# Patient Record
Sex: MALE | Race: WHITE
[De-identification: names, ages, dates, MRNs, and addresses within clinical notes are randomized per-mention and may not be internally consistent; named-entity substitution may affect disease eponyms.]

---

## 2019-10-09 ENCOUNTER — HOSPITAL ENCOUNTER (OUTPATIENT)
Dept: HOSPITAL 61 - PNCL | Age: 39
Discharge: HOME | End: 2019-10-09
Attending: ANESTHESIOLOGY
Payer: OTHER GOVERNMENT

## 2019-10-09 DIAGNOSIS — M19.90: ICD-10-CM

## 2019-10-09 DIAGNOSIS — Z79.899: ICD-10-CM

## 2019-10-09 DIAGNOSIS — M79.605: ICD-10-CM

## 2019-10-09 DIAGNOSIS — Z98.890: ICD-10-CM

## 2019-10-09 DIAGNOSIS — M54.5: Primary | ICD-10-CM

## 2019-10-09 PROCEDURE — G0463 HOSPITAL OUTPT CLINIC VISIT: HCPCS

## 2019-10-09 NOTE — PAIN
DATE OF SERVICE:  10/09/2019



INITIAL CONSULTATION FOR PAIN CLINIC



CHIEF COMPLAINT:  Low back and left lower extremity pain.



HISTORY OF PRESENT ILLNESS:  The patient is a 39-year-old male who presents with

history of pain in the low back, left leg for several years, worse over the past

1 year.  Increasing pain in posterior gluteus, radiating in the posterior thigh

and posterior calf on the left side.  The patient reports it is worse with

walking, standing, changing positions, better with sitting or lying down, but

does awaken him from sleep at least once a night.  The patient reports it does

not affect his bowel or bladder control, but does affect his ability to walk

with significant fatigability in the left leg, but no actual motor loss.  The

patient has had chiropractic treatment, physical therapies in the past, which

were not significantly helpful.  Trigger point injections which did help in

2017, also previous surgery in 2008 with injections in 2012 and radiofrequency

ablation in 2012, all of which helped to some extent, but nothing long lasting. 

The patient is taking Percocet, which helped in the past, but has not had it for

several years as well.  The patient reports the pain is constant, shooting,

radiating into the low back and the left lower extremity as described, burning

and aching at times; again worse with activity, walking, standing; better with

sitting or lying down.  The patient rates his disability rating from 0-10, 10

being the worst, is a 6 with family home responsibilities, social activity and

sexual behavior, 8 with recreation, 7 with occupational activities, 4 with

self-care and life support activities.  The patient did have an MRI scan that is

dated 10/16/2015, showing L5-S1 left paracentral lateral recess disk protrusion

contacting the transitioning nerve roots with moderate left neural foraminal

narrowing.  The patient has a more recent MRI scan, which is pending at time of

this dictation.



PAST MEDICAL HISTORY:  Significant for arthritis, previous microdiskectomy in

2008, wisdom teeth extraction, fractured jaw and pilonidal cyst removed.



CURRENT MEDICATIONS:  Include over-the-counter Tylenol.



ALLERGIES:  The patient has no known drug allergies.



FAMILY HISTORY:  Significant for no major medical problems or conditions that he

is aware of.



SOCIAL HISTORY:  The patient does not smoke.  Does chew tobacco.  Has about 3

alcoholic drinks a month on average.  Does not use any illegal, illicit or

recreational drugs.  He is , lives with his spouse, has 2 children living

at home, is on active  duty.  Lives in [place], [place].



REVIEW OF SYSTEMS:  The patient's review of systems is positive for those items

mentioned in history of present illness.  All systems reviewed and otherwise

negative.  It is complete, full and well documented on the patient's chart.



PHYSICAL EXAMINATION:

VITAL SIGNS:  The patient's blood pressure 129/87, pulse 71, respirations 16,

temperature 97.7 degrees Fahrenheit, height is 74 inches, weight is 281 pounds.

GENERAL:  The patient is awake, alert, oriented, appropriate, very pleasant

demeanor.

HEENT:  Head shows normocephalic, atraumatic.  Extraocular movements are intact

and symmetrical.  Oral cavity; mucous membranes moist and pink.  Dentition is

intact.

NECK:  Shows anterior throat supple without palpable lymphadenopathy noted. 

Swallow reflex symmetrical.

CHEST:  Shows normal on inspection.  Breath sounds clear to auscultation

bilaterally.

HEART:  Shows S1, S2 clear.  No murmurs auscultated.

ABDOMEN:  Soft, nontender, nondistended.  No palpable organomegaly is noted.  No

rebound or guarding demonstrated.

BACK:  Shows spine grossly in the midline.  Normal appearing thoracic kyphosis

and lumbar lordotic curvature.  Well-healed surgical scars noted in the lumbar

distribution.  Lumbar paraspinous muscle shows symmetrical on inspection, on

palpation shows some moderate tenderness diffusely bilaterally, but only

diffusely without significant radiation.  The patient has good rotational motion

of lumbar spine, both laterally as well as extension and flexion without

significant difficulty.

EXTREMITIES:  The patient's lower extremities show deep tendon reflexes at 2+ in

patellar and 1+ tendo-calcaneus tendons.  Motor exam is strong with 5/5

dorsiflexion, extension, quadriceps and hamstring flexion, symmetrical and equal

bilaterally.  The patient's peripheral pulses are 1+ posterior tibial.  No

peripheral edema is noted.  Lower extremities are warm and dry to touch, equal

in color and appearance.  Straight leg raise noted to be mildly positive on the

left with about 40-45 degrees leg raise, which is decreased with knee flexion,

right side is negative.  Gaenslen and Bridger tests are negative bilaterally. 

The patient is able to stand, stand on his toes, has difficulty with balance,

walking with a normal appearing gait, does not appear to favor the right or left

lower extremity significantly, not using any assistive devices to ambulate.

SKIN:  Shows warm and dry, good turgor.  No edema.  No sores, rashes or bruising

throughout.



IMPRESSION:

1.  This is a 39-year-old male with long history of low back pain, status post

lumbar diskectomy in 2008, now with persistent radicular pain in the left lower

extremity, in L5-S1 dermatomal distribution.

2.  MRI scan as noted.

3.  History of arthritis.



PLAN:  Options were discussed with the patient including conservative medical

managements, physical therapies and interventional techniques and he would like

to pursue interventional techniques as he has had success with these in the

past.  We discussed a left L5-S1 transforaminal injection using fluoroscopic

guidance using description as well as anatomical models to describe the

procedure.  Risks were discussed as well.  The patient would like to proceed

with this, he will wait for preauthorization from his insurance provider and the

patient will continue to do stretching and strengthening exercises in the

meantime and once approval is at maintaining, we will have him return for left

L5-S1 transforaminal injection at that time.

 



______________________________

AMBER URBINA MD



DR:  PRATEEK/chelsy  JOB#:  [id number] / [id number]

DD:  10/09/2019 10:15  DT:  10/09/2019 11:22

## 2019-10-18 ENCOUNTER — HOSPITAL ENCOUNTER (OUTPATIENT)
Dept: HOSPITAL 61 - PNCL | Age: 39
End: 2019-10-18
Attending: ANESTHESIOLOGY
Payer: OTHER GOVERNMENT

## 2019-10-18 DIAGNOSIS — M51.16: Primary | ICD-10-CM

## 2019-10-18 PROCEDURE — 64483 NJX AA&/STRD TFRM EPI L/S 1: CPT

## 2019-10-18 NOTE — PAIN
DATE OF SERVICE:  10/18/2019



PROGRESS NOTE FOR PAIN CLINIC



DIAGNOSES:  Lumbar radiculopathy with lumbar degenerative disk disease.



HISTORY OF PRESENT ILLNESS:  The patient is a 39-year-old male who returns for

followup status post initial evaluation and preauthorization for left

transforaminal injection at L5-S1.  The patient reports still significant pain

in the low back, left lower extremity, posterior gluteus, posterior thigh,

posterior calf, radiating with walking, standing, changing positions, better

with sitting or lying down, does not awaken him from sleep at night.  The

patient reports the pain is shooting and becoming more constant with activity,

rates a 9 on a scale of 10 at its worst over the past week, 7 on average, 5 at

its least and is a 5 today.  The patient reports no new motor or sensory

deficits, no new bowel or bladder incontinence or other complaints.



PHYSICAL EXAMINATION:

VITAL SIGNS:  The patient's blood pressure is 138/85, pulse 76, respirations 18,

temperature 98.3 degrees Fahrenheit, height is 74 inches, weight is 278 pounds.

GENERAL:  The patient is awake, alert, oriented, appropriate, very pleasant

demeanor.

HEENT:  Shows normocephalic, atraumatic.  Extraocular movements are intact and

symmetrical.  Oral cavity:  Mucous membranes moist and pink.  Dentition is

intact.

NECK:  Shows anterior throat supple without palpable lymphadenopathy noted. 

Swallow reflex symmetrical.

CHEST:  Shows normal on inspection.  Breath sounds clear to auscultation

bilaterally.

HEART:  Shows S1, S2 clear.  No murmurs auscultated.

ABDOMEN:  Soft, nontender, nondistended.  No palpable organomegaly is noted.  No

rebound or guarding demonstrated.

BACK:  Shows spine grossly in the midline.  Normal appearing thoracic kyphosis,

some minor flattening of lumbar lordotic curvature with well-healed surgical

scar in the lumbar distribution.  The patient's paraspinous musculature shows

symmetrical on inspection, on palpation shows some moderate tenderness diffusely

bilaterally, but only diffusely without significant radiation.

EXTREMITIES:  The patient's lower extremities show deep tendon reflexes 2+ in

the patellar, 1+ tendo-calcaneus tendons.  Motor exam is approximately 4 on a

scale of 5 on the left dorsiflexion, extension, 5/5 on the right.  Peripheral

pulses are 1+ posterior tibia.  No peripheral edema is noted bilaterally.



Options were discussed with the patient.  The patient's old chart was reviewed

as his current medication regimen updated.  Current review of systems updated

today as well.  We will proceed with a lumbar left-sided transforaminal

injection at L5-S1 today with fluoroscopic guidance.  Risks were again discussed

including, but not limited to bleeding, infection, possibility of epidural

hematoma, subsequent neurologic compromise, dural puncture, headaches, spinal

cord and/or nerve damage, side effects of steroid medication, exposure to

fluoroscopy, potential injection of the vertebral artery at that level and

permanent ischemic damage as well as poor results regarding pain control.  The

patient understands and wished to proceed.  The patient will return to clinic in

approximately 2 weeks for followup.  He was counseled on return appointment,

activity level and side effects to be aware of.



DIAGNOSES:  Lumbar radiculopathy with lumbar degenerative disk disease.



PROCEDURE:  Left-sided L5-S1 transforaminal injection using C-arm fluoroscopic

guidance under sterile prep and drape using local anesthetic, a 22-gauge

Billy-tipped needle with stylet.



MEDICATION ADMINISTERED:  Depo-Medrol 80 mg plus 2 mL of 0.25% bupivacaine and

1.5 mL of contrast with good spread medially into the epidural space as well as

laterally along the nerve root.  No uptake or washout with digital subtraction

noted.



CONDITION AT DISCHARGE:  Stable.  The patient tolerated the procedure well, had

no complications.

 



______________________________

AMBER URBINA MD



DR:  PRATEEK/chelsy  JOB#:  [id number] / [id number]

DD:  10/18/2019 10:25  DT:  10/18/2019 11:25

## 2019-11-19 ENCOUNTER — HOSPITAL ENCOUNTER (OUTPATIENT)
Dept: HOSPITAL 61 - PNCL | Age: 39
Discharge: HOME | End: 2019-11-19
Attending: ANESTHESIOLOGY
Payer: OTHER GOVERNMENT

## 2019-11-19 DIAGNOSIS — M51.16: Primary | ICD-10-CM

## 2019-11-19 PROCEDURE — G0463 HOSPITAL OUTPT CLINIC VISIT: HCPCS

## 2019-11-19 NOTE — PAIN
DATE OF SERVICE:  11/19/2019



PROGRESS NOTE FOR PAIN CLINIC



DIAGNOSES:  Lumbar radiculopathy with lumbar degenerative disk disease.



HISTORY OF PRESENT ILLNESS:  The patient is a 39-year-old male who returns for

followup status post lumbar epidural steroid injection transforaminal approach

at left L5-S1 x 1.  The patient reports about a 70% improvement in his left leg.

 He has very minimal pain and now, his main complaint is low back pain radiating

into the lower extremity, occasionally into the posterior gluteus, posterior

thigh on the left, but only occasionally, not every day.  The patient reports it

is only worse with increase in activity as he has been doing recently as he is

active .  The patient reports the pain is 8 on a scale of 10 at its

worst over the past week, 7 on average, 5 at its least and is a 7 today.  The

patient reports it is dull and constant in the back, occasionally in the left

lower extremity with the leg is doing much better after the transforaminal

injection.  The patient reports no new motor or sensory deficits, no new bowel

or bladder incontinence or other complaints, better with sitting or lying down,

does not awake him from sleep at night, worse with walking, standing, changing

positions.



PHYSICAL EXAMINATION:

VITAL SIGNS:  The patient's blood pressure 126/89, pulse 80, respirations 16,

temperature 98.0 degrees Fahrenheit, height 74 inches, weight is 288 pounds.

GENERAL:  The patient is awake, alert, oriented, appropriate, very pleasant

demeanor.

HEENT:  Shows normocephalic, atraumatic.  Extraocular movements are intact and

symmetrical.  Oral cavity:  Mucous membranes moist and pink.  Dentition is

intact.

NECK:  Shows anterior throat supple without palpable lymphadenopathy noted. 

Swallow reflex symmetrical.

CHEST:  Shows normal on inspection.  Breath sounds are clear bilaterally.

HEART:  Shows S1, S2 clear.  No murmurs auscultated.

ABDOMEN:  Soft, nontender, nondistended.

BACK:  Shows spine grossly in the midline.  Lumbar paraspinous muscle shows

symmetrical on inspection, with palpation shows some moderate tenderness

diffusely bilaterally, but only diffusely without radiation.  The patient has

good rotational motion of lumbar spine, both laterally as well as extension and

flexion without difficulty.

EXTREMITIES:  The patient's lower extremities show deep tendon reflexes 2+ in

the patellar, 1+ tendo-calcaneus tendons.  Motor exam is strong with 4 on a

scale of 5 with left dorsiflexion, extension, 5/5 on the right.  Peripheral

pulses are 1+ posterior tibia.  No peripheral edema is noted bilaterally.



Options were discussed with the patient.  The patient's old chart was reviewed

as his current medication regimen updated.  Current review of systems updated

today as well.  We will preauthorize the patient for a second injection as a

translaminar lumbar epidural steroid injection at L5-S1.  He still has some

clinical radiculopathy as well as low back pain bilaterally.  The patient will

continue doing stretching and strengthening exercises in the meantime and once

approval is obtained, we will follow up for second lumbar epidural steroid

injection at L5-S1 at this time.

 



______________________________

AMBER URBINA MD



DR:  PRATEEK/chelsy  JOB#:  [id number] / [id number]

DD:  11/19/2019 09:45  DT:  11/19/2019 10:35

## 2019-12-03 ENCOUNTER — HOSPITAL ENCOUNTER (OUTPATIENT)
Dept: HOSPITAL 61 - PNCL | Age: 39
End: 2019-12-03
Attending: ANESTHESIOLOGY
Payer: OTHER GOVERNMENT

## 2019-12-03 DIAGNOSIS — M51.16: Primary | ICD-10-CM

## 2019-12-03 PROCEDURE — 62323 NJX INTERLAMINAR LMBR/SAC: CPT

## 2019-12-03 NOTE — PAIN
DATE OF SERVICE:  12/03/2019



PROGRESS NOTE FOR PAIN CLINIC



DIAGNOSES:  Lumbar radiculopathy with lumbar degenerative disk disease.



HISTORY OF PRESENT ILLNESS:  The patient is a 39-year-old male who returns for

followup status post lumbar epidural steroid injection and transforaminal

approach x 1 with excellent improvement of 70% better in the left lower

extremity.  Now chief complaint is low back and pain across low back into the

right and left lower extremity.  The patient has been approved for a lumbar

epidural steroid injection as requested after his last visit, reports still

significant pain in the low back, more on the right leg than the left, rated at

8 on a scale of 10 at its worst over the past week, 7 on average and a 6 at its

least and is a 6 today.  The patient reports it is dull, becoming more constant

in the low back and leg.  Initially, he was doing much better with greater

distance walking, doing work activities, and household activities with greater

ease and comfort.  The patient reports sleeping well at night.  No new motor or

sensory deficits, no new bowel or bladder incontinence or other complaints.



PHYSICAL EXAMINATION:

VITAL SIGNS:  The patient's blood pressure 141/96, pulse 84, respirations 18,

temperature 97.5 degrees Fahrenheit, height 74 inches, weight is 288 pounds.

GENERAL:  The patient is awake, alert, oriented, appropriate, very pleasant

demeanor.

HEENT:  Shows normocephalic, atraumatic.  Extraocular movements are intact and

symmetrical.  Oral cavity:  Mucous membranes moist and pink.  Dentition is

intact.

NECK:  Shows anterior throat supple without palpable lymphadenopathy noted. 

Swallow reflex symmetrical.

CHEST:  Shows normal on inspection.  Breath sounds clear to auscultation

bilaterally.

HEART:  Shows S1, S2 clear.  No murmurs auscultated.

ABDOMEN:  Soft, nontender, nondistended.  No palpable organomegaly is noted.

BACK:  Shows spine grossly in the midline.  Normal appearing thoracic kyphosis

and lumbar lordotic curvature.  Lumbar paraspinous muscle shows symmetrical on

inspection, on palpation shows some moderate tenderness diffusely, but only

diffusely in the low lumbar distribution without radiation.

EXTREMITIES:  The patient's lower extremities show deep tendon reflexes at 2+ in

the patellar, 1+ tendo-calcaneus tendons.  Motor exam is approximately 4 on a

scale of 5 on the left and 5/5 on the right, but intact.  Peripheral pulses are

1+ posterior tibia.  No peripheral edema is noted bilaterally.



Options were discussed with the patient.  The patient's old chart was reviewed

as his current medication regimen updated.  Current review of systems updated

today as well.  We will proceed with a second in a series of lumbar epidural

steroid injection today with fluoroscopic guidance.  Risks were again discussed

including, but not limited to bleeding, infection, possibility of epidural

hematoma, subsequent neurological compromise, dural puncture, headaches, spinal

cord and/or nerve damage, side effects of steroid medication and poor results

regarding pain control.  The patient understands and wished to proceed.  The

patient will return to clinic in approximately 2 weeks for followup.  He was

counseled as to return appointment, activity level and side effects to be aware

of.



DIAGNOSIS:  Lumbar radiculopathy with lumbar degenerative disk disease.



PROCEDURE:  Lumbar epidural steroid injection, translaminar approach L5-S1 level

using C-arm fluoroscopic guidance under sterile prep and drape using local

anesthetic.



MEDICATION INJECTED:  A total of 120 mg Depo-Medrol plus 10 mL of

preservative-free normal saline and 2 mL of contrast.



CONDITION AT DISCHARGE:  Stable.  The patient tolerated the procedure well, had

no complications.

 



______________________________

AMBER URBINA MD



DR:  PRATEEK/chelsy  JOB#:  [id number] / [id number]

DD:  12/03/2019 09:30  DT:  12/03/2019 10:24

## 2019-12-17 ENCOUNTER — HOSPITAL ENCOUNTER (OUTPATIENT)
Dept: HOSPITAL 61 - PNCL | Age: 39
Discharge: HOME | End: 2019-12-17
Attending: ANESTHESIOLOGY
Payer: OTHER GOVERNMENT

## 2019-12-17 DIAGNOSIS — M51.16: Primary | ICD-10-CM

## 2019-12-17 PROCEDURE — G0463 HOSPITAL OUTPT CLINIC VISIT: HCPCS

## 2019-12-17 NOTE — PAIN
DATE OF SERVICE:  12/17/2019



PROGRESS NOTE FOR PAIN CLINIC



DIAGNOSES:  Lumbar radiculopathy with lumbar degenerative disk disease.



HISTORY OF PRESENT ILLNESS:  The patient is a 39-year-old male who returns for

followup status post lumbar epidural steroid injection x 1 and transforaminal

injection x 1.  The patient reports the lumbar epidural steroid injection

decreased the pain about 70% in the low back and right lower extremity.  The

patient reports doing much better, increasing activity with greater ease and

comfort, doing walking distances, work activities, household activities,

traveling with greater ease and comfort, very pleased with his progress.  He is

sleeping well at night.  Reports the pain is minimal, rated at 5 on a scale of

10 at its worst over the past week, 3 on average, 2 at its least and is a 2

today.  The patient reports it is dull and aching in the low back, but almost

completely gone in the right lower extremity.  The patient reports no new motor

or sensory deficits, no new bowel or bladder incontinence or other complaints.



PHYSICAL EXAMINATION:

VITAL SIGNS:  The patient's blood pressure is 157/99, pulse 83, respirations 18,

temperature 98.3 degrees Fahrenheit, height is 74 inches, weight is 296 pounds.

GENERAL:  The patient is awake, alert, oriented, appropriate, very pleasant

demeanor.

HEENT:  Shows normocephalic, atraumatic.  Extraocular movements are intact and

symmetrical.  Oral cavity:  Mucous membranes are moist and pink.  Dentition is

intact.

NECK:  Shows anterior throat supple without palpable lymphadenopathy noted. 

Swallow reflex symmetrical.

CHEST:  Shows normal on inspection.  Breath sounds are clear bilaterally.

HEART:  Shows S1, S2 clear.  No murmurs auscultated.

ABDOMEN:  Soft, nontender, nondistended.  No palpable organomegaly is noted.  No

rebound or guarding demonstrated.

BACK:  Shows spine grossly in the midline.  Normal appearing thoracic kyphosis

and lumbar lordotic curvature.  Lumbar paraspinous muscle shows symmetrical on

inspection, with palpation shows some mild tenderness only in the low lumbar

distribution and only very mild, without atrophy or hypertrophy without trigger

points, without radiation.  The patient has full rotational motion of lumbar

spine, both laterally as well as extension and flexion without significant

difficulty.

EXTREMITIES:  Lower extremities show deep tendon reflexes 2+ in the patellar, 1+

tendo-calcaneus tendons.  Motor exam remains strong with mostly 4 on a scale of

5 with left dorsiflexion, extension, but 5/5 quadriceps and hamstring flexion

and 5/5 on the right throughout.  Peripheral pulses are 1+ posterior tibial.  No

peripheral edema is noted bilaterally.



Options were discussed with the patient.  The patient's old chart was reviewed

as his current medication regimen updated.  Current review of systems updated

today as well.  We will proceed with holding any further injections at this time

as he is doing quite a bit better like increase his activity as tolerated, give

this more time and continue doing stretching and strength exercises with low

back and will return to clinic at this time on as needed basis.

 



______________________________

AMBER URBINA MD



DR:  PRATEEK/chelsy  JOB#:  [id number] / [id number]

DD:  12/17/2019 08:57  DT:  12/17/2019 10:15

## 2020-03-03 ENCOUNTER — HOSPITAL ENCOUNTER (EMERGENCY)
Dept: HOSPITAL 63 - ER | Age: 40
Discharge: HOME | End: 2020-03-03
Payer: OTHER GOVERNMENT

## 2020-03-03 VITALS — SYSTOLIC BLOOD PRESSURE: 134 MMHG | DIASTOLIC BLOOD PRESSURE: 89 MMHG

## 2020-03-03 VITALS — HEIGHT: 73 IN | WEIGHT: 275.58 LBS | BODY MASS INDEX: 36.52 KG/M2

## 2020-03-03 DIAGNOSIS — H66.92: Primary | ICD-10-CM

## 2020-03-03 DIAGNOSIS — H72.92: ICD-10-CM

## 2020-03-03 PROCEDURE — 99283 EMERGENCY DEPT VISIT LOW MDM: CPT

## 2020-03-03 NOTE — PHYS DOC
Adult General


Chief Complaint


Chief Complaint:  EARACHE/EAR PAIN





HPI


HPI


40-year-old male presents with left earache. Patient has had intermittent 

discomfort and stuffy feeling in the left ear for a couple of weeks. He started 

to have consistent pain yesterday and began to be concerned he has infection. Roberto castellanos has had infections as an adult, but it is been several years. He has no 

other symptoms. He denies fever or chills.





Review of Systems


Review of Systems





Constitutional: Denies fever or chills []


Eyes: Denies change in visual acuity, redness, or eye pain []


HENT: Left ear pain. Denies nasal congestion or sore throat []


Respiratory: Denies cough or shortness of breath []


Cardiovascular: No additional information not addressed in HPI []


GI: Denies abdominal pain, nausea, vomiting, bloody stools or diarrhea []


: Denies dysuria or hematuria []


Musculoskeletal: Denies back pain or joint pain []


Integument: Denies rash or skin lesions []


Neurologic: Denies headache, focal weakness or sensory changes []


Endocrine: Denies polyuria or polydipsia []





All other systems were reviewed and found to be within normal limits, except as 

documented in this note.





Allergies


Allergies





Allergies








Coded Allergies Type Severity Reaction Last Updated Verified


 


  No Known Drug Allergies    3/3/20 No











Physical Exam


Physical Exam





Constitutional: Well developed, well nourished, no acute distress, non-toxic 

appearance. []


HENT: Normocephalic, atraumatic, bilateral external ears normal, oropharynx 

moist, no oral exudates, nose normal. Tympanic erythematous, bulging and with 

small rupture. []


Eyes: PERRLA, EOMI, conjunctiva normal, no discharge. [] 


Neck: Normal range of motion, no tenderness, supple, no stridor. [] 


Cardiovascular: Heart rate regular rhythm, no murmur []


Lungs & Thorax:  Bilateral breath sounds clear to auscultation []


Abdomen: Bowel sounds normal, soft, no tenderness, no masses, no pulsatile 

masses. [] 


Skin: Warm, dry, no erythema, no rash. [] 


Back: No tenderness, no CVA tenderness. [] 


Extremities: No tenderness, no cyanosis, no clubbing, ROM intact, no edema. [] 


Neurologic: Alert and oriented X 3, normal motor function, normal sensory 

function, no focal deficits noted. []


Psychologic: Affect normal, judgement normal, mood normal. []





EKG


EKG


[]





Radiology/Procedures


Radiology/Procedures


[]





Course & Med Decision Making


Course & Med Decision Making


Pertinent Labs and Imaging studies reviewed. (See chart for details)


The patient appears to have a left otitis media with eardrum rupture. I'll 

advise that he avoid getting his ears wet or any kind of swimming. I will treat 

him with Augmentin for 10 days. He is stable for discharge at this time.


[]





Dragon Disclaimer


Dragon Disclaimer


This electronic medical record was generated, in whole or in part, using a voice

 recognition dictation system.





Departure


Departure:


Impression:  


   Primary Impression:  


   Left otitis media with spontaneous rupture of eardrum


Disposition:  01 HOME, SELF-CARE


Condition:  STABLE


Referrals:  


TRICIA SANTIZO (PCP)


Patient Instructions:  Otitis Media, Adult, Easy-to-Read


Scripts


Amoxicillin/Potassium Clav (AUGMENTIN 875-125 TABLET) 1 Each Tablet


1 TAB PO BID for ear infection for 10 Days, #20 TAB 0 Refills


   Prov: DOLORES HU DO         3/3/20











DOLORES HU DO                  Mar 3, 2020 18:07